# Patient Record
Sex: MALE | Race: WHITE
[De-identification: names, ages, dates, MRNs, and addresses within clinical notes are randomized per-mention and may not be internally consistent; named-entity substitution may affect disease eponyms.]

---

## 2020-09-21 ENCOUNTER — HOSPITAL ENCOUNTER (OUTPATIENT)
Dept: HOSPITAL 75 - ER | Age: 60
Setting detail: OBSERVATION
LOS: 1 days | Discharge: HOME | End: 2020-09-22
Attending: SURGERY | Admitting: SURGERY
Payer: COMMERCIAL

## 2020-09-21 VITALS — WEIGHT: 139.99 LBS | HEIGHT: 65.75 IN | BODY MASS INDEX: 22.77 KG/M2

## 2020-09-21 VITALS — DIASTOLIC BLOOD PRESSURE: 75 MMHG | SYSTOLIC BLOOD PRESSURE: 133 MMHG

## 2020-09-21 DIAGNOSIS — Z80.8: ICD-10-CM

## 2020-09-21 DIAGNOSIS — S22.42XA: Primary | ICD-10-CM

## 2020-09-21 DIAGNOSIS — Z83.3: ICD-10-CM

## 2020-09-21 DIAGNOSIS — F17.210: ICD-10-CM
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
## 2020-09-21 LAB
ALBUMIN SERPL-MCNC: 4.3 GM/DL (ref 3.2–4.5)
ALP SERPL-CCNC: 48 U/L (ref 40–136)
ALT SERPL-CCNC: 14 U/L (ref 0–55)
BASOPHILS # BLD AUTO: 0.1 10^3/UL (ref 0–0.1)
BASOPHILS NFR BLD AUTO: 0 % (ref 0–10)
BILIRUB SERPL-MCNC: 0.2 MG/DL (ref 0.1–1)
BUN/CREAT SERPL: 10
CALCIUM SERPL-MCNC: 9.1 MG/DL (ref 8.5–10.1)
CHLORIDE SERPL-SCNC: 101 MMOL/L (ref 98–107)
CO2 SERPL-SCNC: 25 MMOL/L (ref 21–32)
CREAT SERPL-MCNC: 0.91 MG/DL (ref 0.6–1.3)
EOSINOPHIL # BLD AUTO: 0.3 10^3/UL (ref 0–0.3)
EOSINOPHIL NFR BLD AUTO: 2 % (ref 0–10)
EOSINOPHIL NFR BLD MANUAL: 3 %
GFR SERPLBLD BASED ON 1.73 SQ M-ARVRAT: > 60 ML/MIN
GLUCOSE SERPL-MCNC: 101 MG/DL (ref 70–105)
HCT VFR BLD CALC: 40 % (ref 40–54)
HGB BLD-MCNC: 13.8 G/DL (ref 13.3–17.7)
LYMPHOCYTES # BLD AUTO: 2.1 X 10^3 (ref 1–4)
LYMPHOCYTES NFR BLD AUTO: 14 % (ref 12–44)
MANUAL DIFFERENTIAL PERFORMED BLD QL: YES
MCH RBC QN AUTO: 33 PG (ref 25–34)
MCHC RBC AUTO-ENTMCNC: 35 G/DL (ref 32–36)
MCV RBC AUTO: 95 FL (ref 80–99)
MONOCYTES # BLD AUTO: 0.8 X 10^3 (ref 0–1)
MONOCYTES NFR BLD AUTO: 6 % (ref 0–12)
MONOCYTES NFR BLD: 5 %
NEUTROPHILS # BLD AUTO: 11.2 X 10^3 (ref 1.8–7.8)
NEUTROPHILS NFR BLD AUTO: 78 % (ref 42–75)
NEUTS BAND NFR BLD MANUAL: 80 %
NEUTS BAND NFR BLD: 1 %
PLATELET # BLD: 271 10^3/UL (ref 130–400)
PMV BLD AUTO: 9.1 FL (ref 7.4–10.4)
POTASSIUM SERPL-SCNC: 4.2 MMOL/L (ref 3.6–5)
PROT SERPL-MCNC: 7.7 GM/DL (ref 6.4–8.2)
RBC MORPH BLD: NORMAL
SODIUM SERPL-SCNC: 138 MMOL/L (ref 135–145)
VARIANT LYMPHS NFR BLD MANUAL: 11 %
WBC # BLD AUTO: 14.5 10^3/UL (ref 4.3–11)

## 2020-09-21 PROCEDURE — 85025 COMPLETE CBC W/AUTO DIFF WBC: CPT

## 2020-09-21 PROCEDURE — 96361 HYDRATE IV INFUSION ADD-ON: CPT

## 2020-09-21 PROCEDURE — 94640 AIRWAY INHALATION TREATMENT: CPT

## 2020-09-21 PROCEDURE — 94760 N-INVAS EAR/PLS OXIMETRY 1: CPT

## 2020-09-21 PROCEDURE — 80053 COMPREHEN METABOLIC PANEL: CPT

## 2020-09-21 PROCEDURE — 36415 COLL VENOUS BLD VENIPUNCTURE: CPT

## 2020-09-21 PROCEDURE — 74177 CT ABD & PELVIS W/CONTRAST: CPT

## 2020-09-21 PROCEDURE — 96374 THER/PROPH/DIAG INJ IV PUSH: CPT

## 2020-09-21 PROCEDURE — 96375 TX/PRO/DX INJ NEW DRUG ADDON: CPT

## 2020-09-21 PROCEDURE — 71045 X-RAY EXAM CHEST 1 VIEW: CPT

## 2020-09-21 PROCEDURE — 94664 DEMO&/EVAL PT USE INHALER: CPT

## 2020-09-21 PROCEDURE — 71260 CT THORAX DX C+: CPT

## 2020-09-21 PROCEDURE — 85027 COMPLETE CBC AUTOMATED: CPT

## 2020-09-21 PROCEDURE — 85007 BL SMEAR W/DIFF WBC COUNT: CPT

## 2020-09-21 PROCEDURE — 99283 EMERGENCY DEPT VISIT LOW MDM: CPT

## 2020-09-21 PROCEDURE — 96376 TX/PRO/DX INJ SAME DRUG ADON: CPT

## 2020-09-21 RX ADMIN — OXYCODONE HYDROCHLORIDE AND ACETAMINOPHEN PRN TAB: 10; 325 TABLET ORAL at 23:37

## 2020-09-21 RX ADMIN — SODIUM CHLORIDE, SODIUM LACTATE, POTASSIUM CHLORIDE, AND CALCIUM CHLORIDE SCH MLS/HR: 600; 310; 30; 20 INJECTION, SOLUTION INTRAVENOUS at 23:15

## 2020-09-21 NOTE — ANESTHESIA-PROCEDURE NOTE
Procedures/Interventions


Procedure Start/Stop/Diagnosis


Date of Procedure:  Sep 21, 2020


Start Time:  21:11


Stop Time:  21:25





Additional Procedures


Procedures


attempted intercostal nerve block.  inject rib 8 on left after walking off rib 

inferiorly .  1 cc or .5 ropiviciane injected after negative aspiration.   pt 

said  i dont want anymore.  procedure aborted.











YURI CHUNG CRNA              Sep 21, 2020 21:41

## 2020-09-21 NOTE — NUR
ASSUMED CARE OF THIS PATIENT AT THIS TIME. REPORT FROM SRIKANTH EMANUEL. PATIENT IN CT 
AT THIS TIME.

## 2020-09-21 NOTE — NUR
JACKIE ABRAHAM admitted to room 419-1, with an admitting diagnosis of rib fracture and 
intractable pain, on 09/21/20 from [facility] via wheelchair, accompanied by 
staff.JACKIE ABRAHAM introduced to surroundings, call light, bed controls, phone, TV, 
temperature control, lights, meal times, smoking policy, visitor policy, side rail policy, 
bathrooms and showers.  Patient Rights given to patient in the handbook. JACKIE ABRAHAM 
verbalizes understanding that [facility] is not responsible for the loss or damage to any 
personal effects or valuables that are kept in the patients posession during their 
hospitalization.

## 2020-09-21 NOTE — NUR
Pulled 1% Lidocaine for the anesthesiologist Dr. Allen Castro. Patient did not tolerate 
intercostal block well and requested Dr. Castro to stop after the first block was placed. 
Remaining Lidocaine placed in patient drawer with ID sticker.

## 2020-09-21 NOTE — DIAGNOSTIC IMAGING REPORT
PROCEDURE: CT chest, abdomen, and pelvis with contrast.



TECHNIQUE: Multiple contiguous axial images were obtained through

the chest, abdomen, and pelvis after the administration of

intravenous contrast. Auto Exposure Controls were utilized during

the CT exam to meet ALARA standards for radiation dose reduction.





DATE: September 21, 2020.



COMPARISON: September 21, 2020. 



INDICATION: 59-year-old male, fall. Left rib pain. Hypotension.



FINDINGS: 



There are mild upper lobe findings of emphysema. There is a 3 mm

right lower lobe pulmonary nodule on axial image 44. There is no

identified lung mass. There is no otherwise noted focal airspace

consolidation. There is no pneumothorax. There is no pleural

effusion. The central airways are patent.



There is no identified central pulmonary embolus. The main

pulmonary artery is normal in caliber. The heart is not enlarged.

There is no pericardial effusion. There are coronary artery

calcifications and additional areas of atherosclerotic disease.

There is no evidence of acute aortic injury. There is no

mediastinal hematoma.



The liver is unremarkable in size and contour. There is no

identified liver laceration. There is no perihepatic fluid. The

main, right, and left portal veins are patent. The gallbladder is

unremarkable. There is no biliary ductal dilation. The main

pancreatic duct is not abnormally dilated. Unremarkable

appearance of the pancreatic parenchyma. The spleen is normal in

size. The adrenal glands are unremarkable.



Unremarkable appearance of the renal parenchyma. The urinary

collecting systems are not distended. The urinary bladder is

unremarkable.



The intestinal tract is not distended. There is no free

intraperitoneal air. There is no drainable fluid collection.

There is no free pelvic fluid.



There is no identified abnormally enlarged lymph node in the

abdomen or pelvis meeting CT size criteria for adenopathy.



There are 2 separate displaced fractures of the left 7th rib as

well as a displaced fracture of the left 8th rib. There is a

mildly displaced fracture of the right lateral 4th rib of

uncertain exact age. There are chronic-appearing deformities of

the right 3rd, 5th, and 6th ribs as well as a chronic-appearing

deformity of the right 12th rib and 10th and 11th ribs. 



IMPRESSION: 

CT chest, abdomen, and pelvis.

1. Acute displaced fractures of the left 7th rib laterally, left

7th rib posteriorly, and left 8th rib.

2. No sizable pleural effusion or pneumothorax.

3. No additional acute cardiopulmonary abnormality.

4. No identified acute abnormality in the abdomen or pelvis.



Dictated by: 



  Dictated on workstation # [id number]

## 2020-09-21 NOTE — ED GENERAL
General


Chief Complaint:  Chest Wall


Stated Complaint:  FALL;RIB PAIN


Source of Information:  Patient


Exam Limitations:  No Limitations





History of Present Illness


Date Seen by Provider:  Sep 21, 2020


Time Seen by Provider:  18:48


Initial Comments


To ER with reports of a fall and shortness of breath/left lateral rib pain after

a fall down a few stairs from about chest height just prior to arrival.


Timing/Duration:  1-2 Days


Severity:  Moderate


Associated Systoms:  Denies Symptoms





Allergies and Home Medications


Allergies


Coded Allergies:  


     No Known Drug Allergies (Unverified , 12)





Home Medications


Cyclobenzaprine Hcl 10 Mg Tablet, 1 EACH PO Q8HR PRN


   Prescribed by: MAKENZIE GERMAIN on 12 1123


Hydrocodone Bit/Acetaminophen 1 Each Tablet, 1-2 EACH PO Q6H PRN


   Prescribed by: MAKENZIE GERMAIN on 12 1123


Naproxen 500 Mg Tablet, 1 EACH PO BID PRN


   Prescribed by: MAKENZIE GERMAIN on 12 1123





Patient Home Medication List


Home Medication List Reviewed:  Yes





Review of Systems


Review of Systems


Constitutional:  see HPI


EENTM:  see HPI


Respiratory:  no symptoms reported


Cardiovascular:  no symptoms reported


Genitourinary:  no symptoms reported


Musculoskeletal:  no symptoms reported


Skin:  no symptoms reported


Psychiatric/Neurological:  No Symptoms Reported


Hematologic/Lymphatic:  No Symptoms Reported


Immunological/Allergic:  no symptoms reported





Past Medical-Social-Family Hx


Patient Social History


Alcohol Use:  Occasionally Uses


Recreational Drug Use:  No


Smoking Status:  Current Everyday Smoker


Type Used:  Cigarettes


Recent Foreign Travel:  No


Contact w/Someone Who Travel:  No


Recent Hopitalizations:  No





Seasonal Allergies


Seasonal Allergies:  No





Past Medical History


Surgeries:  No


Respiratory:  No


Cardiac:  No


Neurological:  No


Genitourinary:  No


Gastrointestinal:  No


Musculoskeletal:  No


Endocrine:  No


HEENT:  No


Cancer:  No


Psychosocial:  No


Integumentary:  No





Physical Exam


Vital Signs





Vital Signs - First Documented








 20





 18:23


 


Temp 36.0


 


Pulse 82


 


Resp 14


 


B/P (MAP) 88/53 (65)


 


Pulse Ox 96





Capillary Refill :


Height, Weight, BMI


Height: '"


Weight: lbs. oz. kg;  BMI


Method:Stated


General Appearance:  No Apparent Distress, Thin, Other (little abrasion to the 

left lateral chest without palpable crepitus. Initial blood pressure was 80 

systolic, this was repeated on the left arm and found to be 111/69.)


Eyes:  Bilateral Eye Normal Inspection, Bilateral Eye PERRL


HEENT:  PERRL/EOMI, TMs Normal


Respiratory:  Normal Breath Sounds, No Accessory Muscle Use, No Respiratory Di

stress


Cardiovascular:  Regular Rate, Rhythm, Normal Peripheral Pulses


Gastrointestinal:  Non Tender, Soft


Extremity:  Normal Capillary Refill, Normal Inspection


Neurologic/Psychiatric:  Alert, Oriented x3


Skin:  Normal Color, Warm/Dry





Progress/Results/Core Measures


Suspected Sepsis


SIRS


Temperature: 


Pulse:  


Respiratory Rate: 


 


Laboratory Tests


20 18:21: White Blood Count 14.5H


Blood Pressure  / 


Mean: 


 


Laboratory Tests


20 18:21: 


Creatinine 0.91, Platelet Count 271, Total Bilirubin 0.2








Results/Orders


Lab Results





Laboratory Tests








Test


 20


18:21 Range/Units


 


 


White Blood Count


 14.5 H


 4.3-11.0


10^3/uL


 


Red Blood Count


 4.16 L


 4.35-5.85


10^6/uL


 


Hemoglobin 13.8  13.3-17.7  G/DL


 


Hematocrit 40  40-54  %


 


Mean Corpuscular Volume 95  80-99  FL


 


Mean Corpuscular Hemoglobin 33  25-34  PG


 


Mean Corpuscular Hemoglobin


Concent 35 


 32-36  G/DL





 


Red Cell Distribution Width 14.5  10.0-14.5  %


 


Platelet Count


 271 


 130-400


10^3/uL


 


Mean Platelet Volume 9.1  7.4-10.4  FL


 


Neutrophils (%) (Auto) 78 H 42-75  %


 


Lymphocytes (%) (Auto) 14  12-44  %


 


Monocytes (%) (Auto) 6  0-12  %


 


Eosinophils (%) (Auto) 2  0-10  %


 


Basophils (%) (Auto) 0  0-10  %


 


Neutrophils # (Auto) 11.2 H 1.8-7.8  X 10^3


 


Lymphocytes # (Auto) 2.1  1.0-4.0  X 10^3


 


Monocytes # (Auto) 0.8  0.0-1.0  X 10^3


 


Eosinophils # (Auto)


 0.3 


 0.0-0.3


10^3/uL


 


Basophils # (Auto)


 0.1 


 0.0-0.1


10^3/uL


 


Neutrophils % (Manual) 80   %


 


Lymphocytes % (Manual) 11   %


 


Monocytes % (Manual) 5   %


 


Eosinophils % (Manual) 3   %


 


Band Neutrophils 1   %


 


Blood Morphology Comment NORMAL   


 


Sodium Level 138  135-145  MMOL/L


 


Potassium Level 4.2  3.6-5.0  MMOL/L


 


Chloride Level 101    MMOL/L


 


Carbon Dioxide Level 25  21-32  MMOL/L


 


Anion Gap 12  5-14  MMOL/L


 


Blood Urea Nitrogen 9  7-18  MG/DL


 


Creatinine


 0.91 


 0.60-1.30


MG/DL


 


Estimat Glomerular Filtration


Rate > 60 


  





 


BUN/Creatinine Ratio 10   


 


Glucose Level 101    MG/DL


 


Calcium Level 9.1  8.5-10.1  MG/DL


 


Corrected Calcium 8.9  8.5-10.1  MG/DL


 


Total Bilirubin 0.2  0.1-1.0  MG/DL


 


Aspartate Amino Transf


(AST/SGOT) 24 


 5-34  U/L





 


Alanine Aminotransferase


(ALT/SGPT) 14 


 0-55  U/L





 


Alkaline Phosphatase 48    U/L


 


Total Protein 7.7  6.4-8.2  GM/DL


 


Albumin 4.3  3.2-4.5  GM/DL








My Orders





Orders - ADAM URBINA


Hydromorphone Injection (Dilaudid Inject (20 18:37)


Cbc With Automated Diff (20 18:43)


Comprehensive Metabolic Panel (20 18:43)


Chest 1 View, Ap/Pa Only (20 18:43)


Ct Chest/Abdomen/Pelvis W (20 18:43)


Ns Iv 1000 Ml (Sodium Chloride 0.9%) (20 18:40)


Hydromorphone Injection (Dilaudid Inject (20 19:00)


Hydromorphone Injection (Dilaudid Inject (20 19:00)


Manual Differential (20 18:21)


Iohexol Injection (Omnipaque 350 Mg/Ml 1 (20 19:00)


Received Contrast (Hold Metformin- Contr (20 19:00)


Ns (Ivpb) (Sodium Chloride 0.9% Ivpb Bag (20 19:00)


Incentive Spirometry (Nursing) Q2H (20 19:58)


Ketamine Syringe (Ed Only) (Ketamine Syr (20 20:00)


Ns (Ivpb) (Sodium Chloride 0.9%) (20 20:15)


Ns (Ivpb) (Sodium Chloride 0.9%) (20 19:59)


Ketorolac Injection (Toradol Injection) (20 20:15)





Medications Given in ED





Current Medications








 Medications  Dose


 Ordered  Sig/Leonard


 Route  Start Time


 Stop Time Status Last Admin


Dose Admin


 


 Hydromorphone HCl  0.5 mg  ONCE  ONCE


 IV  20 19:00


 20 19:01 DC 20 18:38


0.5 MG


 


 Hydromorphone HCl  0.5 mg  ONCE  ONCE


 IV  20 19:00


 20 19:01 DC 20 19:30


0.5 MG


 


 Iohexol  100 ml  ONCE  ONCE


 IV  20 19:00


 20 19:01 DC 20 19:12


100 ML


 


 Ketamine HCl  15 mg  ONCE  ONCE


 IV  20 20:00


 20 20:01 DC 20 20:12


15 MG


 


 Sodium Chloride  100 ml  ONCE  ONCE


 IV  20 19:00


 20 19:01 DC 20 19:12


100 ML


 


 Sodium Chloride  250 ml @ 


 999 mls/hr  Q16M ONCE


 IV  20 20:15


 20 20:30  20 20:12


999 MLS/HR


 


 Sodium Chloride  1,000 ml @ 


 ud  STK-MED ONCE


 .ROUTE  20 18:40


 20 18:46 DC 20 18:40


999 MLS/HR








Vital Signs/I&O











 20





 18:23


 


Temp 36.0


 


Pulse 82


 


Resp 14


 


B/P (MAP) 88/53 (65)


 


Pulse Ox 96





Capillary Refill :





Diagnostic Imaging





   Diagonstic Imaging:  Xray


Comments


NAME:   JACKIE ABRAHAM


MED REC#:   [id number]


ACCOUNT#:   [id number]


PT STATUS:   REG ER


:   1960


PHYSICIAN:   ADAM URBINA


ADMIT DATE:   20/ER


                                  ***Signed***


Date of Exam:20





CT CHEST/ABDOMEN/PELVIS W








PROCEDURE: CT chest, abdomen, and pelvis with contrast.





TECHNIQUE: Multiple contiguous axial images were obtained through


the chest, abdomen, and pelvis after the administration of


intravenous contrast. Auto Exposure Controls were utilized during


the CT exam to meet ALARA standards for radiation dose reduction.








DATE: 2020.





COMPARISON: 2020. 





INDICATION: 59-year-old male, fall. Left rib pain. Hypotension.





FINDINGS: 





There are mild upper lobe findings of emphysema. There is a 3 mm


right lower lobe pulmonary nodule on axial image 44. There is no


identified lung mass. There is no otherwise noted focal airspace


consolidation. There is no pneumothorax. There is no pleural


effusion. The central airways are patent.





There is no identified central pulmonary embolus. The main


pulmonary artery is normal in caliber. The heart is not enlarged.


There is no pericardial effusion. There are coronary artery


calcifications and additional areas of atherosclerotic disease.


There is no evidence of acute aortic injury. There is no


mediastinal hematoma.





The liver is unremarkable in size and contour. There is no


identified liver laceration. There is no perihepatic fluid. The


main, right, and left portal veins are patent. The gallbladder is


unremarkable. There is no biliary ductal dilation. The main


pancreatic duct is not abnormally dilated. Unremarkable


appearance of the pancreatic parenchyma. The spleen is normal in


size. The adrenal glands are unremarkable.





Unremarkable appearance of the renal parenchyma. The urinary


collecting systems are not distended. The urinary bladder is


unremarkable.





The intestinal tract is not distended. There is no free


intraperitoneal air. There is no drainable fluid collection.


There is no free pelvic fluid.





There is no identified abnormally enlarged lymph node in the


abdomen or pelvis meeting CT size criteria for adenopathy.





There are 2 separate displaced fractures of the left 7th rib as


well as a displaced fracture of the left 8th rib. There is a


mildly displaced fracture of the right lateral 4th rib of


uncertain exact age. There are chronic-appearing deformities of


the right 3rd, 5th, and 6th ribs as well as a chronic-appearing


deformity of the right 12th rib and 10th and 11th ribs. 





IMPRESSION: 


CT chest, abdomen, and pelvis.


1. Acute displaced fractures of the left 7th rib laterally, left


7th rib posteriorly, and left 8th rib.


2. No sizable pleural effusion or pneumothorax.


3. No additional acute cardiopulmonary abnormality.


4. No identified acute abnormality in the abdomen or pelvis.





Dictated by: 





  Dictated on workstation # [id number]








Dict:   20


Trans:   20


CV [id number]





Interpreted by:     RIKKI SILVA MD


Electronically signed by: RIKKI SILVA MD 20





Departure


Communication (Admissions)


Time/Spoke to Admitting Phy:  20:16


-after 1mg dilaudid his pain is still at what he would consider an 

intolerable level. Ketamine ordered. We'll discuss with trauma surgeon.


-spoke with Dr. Ashraf, we'll admit, consult anesthesia for intercostal 

nerve block





Impression





   Primary Impression:  


   Rib fractures


   Additional Impression:  


   Intractable pain


Disposition:   ADMITTED AS INPATIENT


Condition:  Stable





Admissions


Decision to Admit Reason:  Admit from ER (Trauma)


Decision to Admit/Date:  Sep 21, 2020


Time/Decision to Admit Time:  19:59





Departure-Patient Inst.


Decision time for Depature:  19:52


Referrals:  


NO,LOCAL PHYSICIAN (PCP/Family)


Primary Care Physician











ADAM URBINA             Sep 21, 2020 18:50

## 2020-09-21 NOTE — DIAGNOSTIC IMAGING REPORT
INDICATION: Fall with left rib pain,



Single AP view of the chest is obtained. There is no previous

study available at this time for comparison. Heart size and

pulmonary vascularity are within normal limits. There is no

pneumothorax or consolidation. There is a mildly displaced

fracture involving the posterior aspect of left 6th rib with

nondisplaced fracture involving left 7th rib.



IMPRESSION: Left 6th and 7th rib fractures without evidence of

pneumothorax or significant pulmonary contusion.



Dictated by: 



  Dictated on workstation # [id number]

## 2020-09-22 VITALS — SYSTOLIC BLOOD PRESSURE: 117 MMHG | DIASTOLIC BLOOD PRESSURE: 63 MMHG

## 2020-09-22 VITALS — DIASTOLIC BLOOD PRESSURE: 66 MMHG | SYSTOLIC BLOOD PRESSURE: 98 MMHG

## 2020-09-22 VITALS — DIASTOLIC BLOOD PRESSURE: 76 MMHG | SYSTOLIC BLOOD PRESSURE: 108 MMHG

## 2020-09-22 VITALS — SYSTOLIC BLOOD PRESSURE: 111 MMHG | DIASTOLIC BLOOD PRESSURE: 69 MMHG

## 2020-09-22 VITALS — SYSTOLIC BLOOD PRESSURE: 104 MMHG | DIASTOLIC BLOOD PRESSURE: 71 MMHG

## 2020-09-22 LAB
ALBUMIN SERPL-MCNC: 3.5 GM/DL (ref 3.2–4.5)
ALP SERPL-CCNC: 43 U/L (ref 40–136)
ALT SERPL-CCNC: 11 U/L (ref 0–55)
BASOPHILS # BLD AUTO: 0 10^3/UL (ref 0–0.1)
BASOPHILS NFR BLD AUTO: 0 % (ref 0–10)
BILIRUB SERPL-MCNC: 0.3 MG/DL (ref 0.1–1)
BUN/CREAT SERPL: 11
CALCIUM SERPL-MCNC: 8.4 MG/DL (ref 8.5–10.1)
CHLORIDE SERPL-SCNC: 106 MMOL/L (ref 98–107)
CO2 SERPL-SCNC: 24 MMOL/L (ref 21–32)
CREAT SERPL-MCNC: 0.81 MG/DL (ref 0.6–1.3)
EOSINOPHIL # BLD AUTO: 0.3 10^3/UL (ref 0–0.3)
EOSINOPHIL NFR BLD AUTO: 4 % (ref 0–10)
GFR SERPLBLD BASED ON 1.73 SQ M-ARVRAT: > 60 ML/MIN
GLUCOSE SERPL-MCNC: 84 MG/DL (ref 70–105)
HCT VFR BLD CALC: 35 % (ref 40–54)
HGB BLD-MCNC: 11.7 G/DL (ref 13.3–17.7)
LYMPHOCYTES # BLD AUTO: 2.2 X 10^3 (ref 1–4)
LYMPHOCYTES NFR BLD AUTO: 27 % (ref 12–44)
MANUAL DIFFERENTIAL PERFORMED BLD QL: NO
MCH RBC QN AUTO: 32 PG (ref 25–34)
MCHC RBC AUTO-ENTMCNC: 34 G/DL (ref 32–36)
MCV RBC AUTO: 96 FL (ref 80–99)
MONOCYTES # BLD AUTO: 0.9 X 10^3 (ref 0–1)
MONOCYTES NFR BLD AUTO: 11 % (ref 0–12)
NEUTROPHILS # BLD AUTO: 4.8 X 10^3 (ref 1.8–7.8)
NEUTROPHILS NFR BLD AUTO: 58 % (ref 42–75)
PLATELET # BLD: 236 10^3/UL (ref 130–400)
PMV BLD AUTO: 9.2 FL (ref 7.4–10.4)
POTASSIUM SERPL-SCNC: 3.8 MMOL/L (ref 3.6–5)
PROT SERPL-MCNC: 5.9 GM/DL (ref 6.4–8.2)
SODIUM SERPL-SCNC: 138 MMOL/L (ref 135–145)
WBC # BLD AUTO: 8.3 10^3/UL (ref 4.3–11)

## 2020-09-22 RX ADMIN — SODIUM CHLORIDE, SODIUM LACTATE, POTASSIUM CHLORIDE, AND CALCIUM CHLORIDE SCH MLS/HR: 600; 310; 30; 20 INJECTION, SOLUTION INTRAVENOUS at 05:28

## 2020-09-22 RX ADMIN — OXYCODONE HYDROCHLORIDE AND ACETAMINOPHEN PRN TAB: 10; 325 TABLET ORAL at 11:07

## 2020-09-22 RX ADMIN — OXYCODONE HYDROCHLORIDE AND ACETAMINOPHEN PRN TAB: 10; 325 TABLET ORAL at 05:27

## 2020-09-22 NOTE — DISCHARGE INST-SURGICAL
Discharge Inst-Surgical


Depart Medication/Instructions


New, Converted or Re-Newed RX:  RX Given to Pt/Family


Patient Instructions


Follow up Appt:


Make appointment for 1 week. [phone]





Instructions:


No lifting greater than 20 pounds.


No strenuous activity. 


May shower in 24 hours, no tub bath or soaking.


Use incentive spirometer at home as directed.


No Smoking








Symptoms to Report:


Appetite Changes, Extremity Discoloration, Numbness/Tingling, Swelling 

Increased, Bleeding Excessive, Eyesight Changes, Pain Increased, Urine Color 

Change, Constipation(Persistent), Fever over 101 degree F, Pain/Pressure in 

chest, Urinating Difficulty, Cough Up/Vomit Blood, Heart Beat Irreg/Pounding, 

Pain/Pressure in jaw, Cramps in feet or legs, Lightheadedness, Pain/Pressure in 

shoulder, Diarrhea(Persistent), Memory Changes Suddenly, Questions/Concerns, 

Weight gain consecutive days, Dizziness/Fainting, Nausea/Vomiting, Shortness of 

Breath, Weight gain over 2 pounds








If questions or concerns contact your physician 


Or seek help at emergency department.





Activity


Activity as Tolerated:  Yes


Activity Instructions:  Avoid Pulling & Pushing


Driving Instructions:  No Driving/Refer to Dr. Sylvester


Discharge Diet:  No Restrictions


Diet After 24 Hours:  Clear Liquid if Nauseous


If Any Problems/Questions/Issu:  Contact Your Physician, Go to Emergency Room





Skin/Wound Care


Infection Signs and Symptoms:  Increased Redness, Increased Swelling, 

Temperature Above 101  F


Bathing Instructions:  GARRETT Mejia DO               Sep 22, 2020 15:55

## 2020-09-22 NOTE — HISTORY & PHYSICAL-SURGICAL
BARON LIMA MED STUDENT 20 1046:


History of Present Illness


History of Present Illness


Reason for visit/HPI


cc- L rib 7 and rib 8 fractures 





hpi: pt was sitting up in bed watching tv. pt stated that was walking and fell 

onto the steps and hit his left side. pt describes the pain as sharp. pt rated 

pain as an 8 out of 10. pt stated that coughing really makes the pain worse. pt 

stated that only the pain medications make the pain better. pt states that he is

doing the incentive spirometer. there are no lacerations or any bruising along 

the L ribs.


Date of Admission


Sep 21, 2020 at 20:13


Date Seen by a Provider:  Sep 22, 2020


Time Seen by a Provider:  09:50


I consulted on this patient on


20


 10:41


Attending Physician


Garrett Ashraf DO


Admitting Physician


No,Local Physician


Consult








Allergies and Home Medications


Allergies


Coded Allergies:  


     No Known Drug Allergies (Unverified , 12)





Home Medications


Ketorolac Tromethamine 10 Mg Tablet, 10 MG PO Q8H


   Prescribed by: GARRETT ASHRAF on 20 1253


Oxycodone HCl/Acetaminophen 1 Each Tablet, 1 TAB PO Q6H PRN for PAIN-MODERATE 

(5-7)


   Prescribed by: GARRETT ASHRAF on 20 1253





Past Medical-Social-Family Hx


Patient Social History


Alcohol Use:  Regular Use


Recreational Drug Use:  No


Smoking Status:  Current Everyday Smoker


Type Used:  Cigarettes





Seasonal Allergies


Seasonal Allergies:  No





Surgeries


History of Surgeries:  No





Respiratory


History of Respiratory Disorde:  No





Cardiovascular


History of Cardiac Disorders:  No





Neurological


History of Neurological Disord:  No





Genitourinary


History of Genitourinary Disor:  No





Gastrointestinal


History of Gastrointestinal Di:  No





Musculoskeletal


History of Musculoskeletal Dis:  No





Endocrine


History of Endocrine Disorders:  No





HEENT


History of HEENT Disorders:  No





Cancer


History of Cancer:  No





Psychosocial


History of Psychiatric Problem:  No





Integumentary


History of Skin or Integumenta:  No





Family Medical History


Significant Family History:  Cancer (mother  of bone marrow cancer ), 

Diabetes


Family Medial History:  


Completed stroke


  19 FATHER


Diabetes mellitus


  20 MOTHER





Review of Systems


Constitutional:  No chills, No fever, No malaise


Respiratory:  No cough, No short of breath


Cardiovascular:  No chest pain, No palpitations


Gastrointestinal:  No abdominal pain, No constipation, No diarrhea, No nausea, 

No vomiting


Genitourinary:  No dysuria, No incontinence





Physical Exam


Vital Signs





Vital Signs - First Documented








 20





 18:23 00:30


 


Temp 36.0 


 


Pulse 82 


 


Resp 14 


 


B/P (MAP) 88/53 (65) 


 


Pulse Ox 96 


 


FiO2  21





Capillary Refill : Less Than 3 Seconds


Height, Weight, BMI


Height: '"


Weight: lbs. oz. kg; 22.76 BMI


Method:Stated


General Appearance:  No Apparent Distress, WD/WN


HEENT:  No Scleral Icterus (L), No Scleral Icterus (R)


Neck:  Non Tender; No Lymphadenopathy (L), No Lymphadenopathy (R)


Respiratory:  No Chest Non Tender; Lungs Clear, No Accessory Muscle Use, No 

Respiratory Distress, Other (chest pain with deep inspiration )


Cardiovascular:  Regular Rate, Rhythm, No Murmur, Normal Peripheral Pulses


Neurologic/Psychiatric:  Alert, Oriented x3, No Motor/Sensory Deficits, Normal 

Mood/Affect, CNs II-XII Norm as Tested


Skin:  Normal Color, Warm/Dry


Lymphatic:  No Adenopathy





Data Review


Labs


Laboratory Tests


20 18:21: 


White Blood Count 14.5H, Red Blood Count 4.16L, Hemoglobin 13.8, Hematocrit 40, 

Mean Corpuscular Volume 95, Mean Corpuscular Hemoglobin 33, Mean Corpuscular 

Hemoglobin Concent 35, Red Cell Distribution Width 14.5, Platelet Count 271, 

Mean Platelet Volume 9.1, Neutrophils (%) (Auto) 78H, Lymphocytes (%) (Auto) 14,

Monocytes (%) (Auto) 6, Eosinophils (%) (Auto) 2, Basophils (%) (Auto) 0, 

Neutrophils # (Auto) 11.2H, Lymphocytes # (Auto) 2.1, Monocytes # (Auto) 0.8, 

Eosinophils # (Auto) 0.3, Basophils # (Auto) 0.1, Neutrophils % (Manual) 80, 

Lymphocytes % (Manual) 11, Monocytes % (Manual) 5, Eosinophils % (Manual) 3, 

Band Neutrophils 1, Blood Morphology Comment NORMAL, Sodium Level 138, Potassium

Level 4.2, Chloride Level 101, Carbon Dioxide Level 25, Anion Gap 12, Blood Urea

Nitrogen 9, Creatinine 0.91, Estimat Glomerular Filtration Rate > 60, 

BUN/Creatinine Ratio 10, Glucose Level 101, Calcium Level 9.1, Corrected Calcium

8.9, Total Bilirubin 0.2, Aspartate Amino Transf (AST/SGOT) 24, Alanine 

Aminotransferase (ALT/SGPT) 14, Alkaline Phosphatase 48, Total Protein 7.7, 

Albumin 4.3


20 04:45: 


White Blood Count 8.3, Red Blood Count 3.62L, Hemoglobin 11.7L, Hematocrit 35L, 

Mean Corpuscular Volume 96, Mean Corpuscular Hemoglobin 32, Mean Corpuscular 

Hemoglobin Concent 34, Red Cell Distribution Width 14.7H, Platelet Count 236, 

Mean Platelet Volume 9.2, Neutrophils (%) (Auto) 58, Lymphocytes (%) (Auto) 27, 

Monocytes (%) (Auto) 11, Eosinophils (%) (Auto) 4, Basophils (%) (Auto) 0, 

Neutrophils # (Auto) 4.8, Lymphocytes # (Auto) 2.2, Monocytes # (Auto) 0.9, 

Eosinophils # (Auto) 0.3, Basophils # (Auto) 0.0, Sodium Level 138, Potassium 

Level 3.8, Chloride Level 106, Carbon Dioxide Level 24, Anion Gap 8, Blood Urea 

Nitrogen 9, Creatinine 0.81, Estimat Glomerular Filtration Rate > 60, 

BUN/Creatinine Ratio 11, Glucose Level 84, Calcium Level 8.4L, Corrected Calcium

8.8, Total Bilirubin 0.3, Aspartate Amino Transf (AST/SGOT) 16, Alanine 

Aminotransferase (ALT/SGPT) 11, Alkaline Phosphatase 43, Total Protein 5.9L, 

Albumin 3.5








Assessment/Plan


L rib 7 and rib 8 fractures 





continue incentive spirometry 


pain control as needed





Clinical Quality Measures


DVT/VTE Risk/Contraindication:


Risk Factor Score Per Nursin


RFS Level Per Nursing on Admit:  2=Moderate





GARRETT ASHRAF DO 20 1602:


History of Present Illness


History of Present Illness


Reason for visit/HPI


Pt seen and examined, states pain is better.  Unable to tolerate rib block last 

night; states the pain he has now is better than it was and much better with 

pain pills.


Time Seen by a Provider:  12:36





Allergies and Home Medications


Allergies


Coded Allergies:  


     No Known Drug Allergies (Unverified , 12)





Home Medications


Ketorolac Tromethamine 10 Mg Tablet, 10 MG PO Q8H


   Prescribed by: GARRETT ASHRAF on 20 1253


Oxycodone HCl/Acetaminophen 1 Each Tablet, 1 TAB PO Q6H PRN for PAIN-MODERATE 

(5-7)


   Prescribed by: GARRETT ASHRAF on 20 1253





Patient Home Medication List


Home Medication List Reviewed:  Yes





Past Medical-Social-Family Hx


Patient Social History


Alcohol Use:  Regular Use


Smoking Status:  Current Everyday Smoker





Family Medical History


Significant Family History:  Cancer (mother  of bone marrow cancer ), Susana

betes


Family Medial History:  


Completed stroke


  19 FATHER


Diabetes mellitus


  19 MOTHER





Physical Exam


General Appearance:  Mild Distress, Thin


Eyes:  Bilateral Eye PERRL, Bilateral Eye EOMI


HEENT:  No Scleral Icterus (L), No Scleral Icterus (R); Other (poor dentition)


Respiratory:  Lungs Clear, No Accessory Muscle Use, No Respiratory Distress, 

Other (chest pain with deep inspiration )


Cardiovascular:  Regular Rate, Rhythm, No Murmur


Gastrointestinal:  No Organomegaly, Non Tender, Soft


Neurologic/Psychiatric:  Alert, Oriented x3, Normal Mood/Affect, CNs II-XII Norm

as Tested


Skin:  Normal Color, Warm/Dry


Lymphatic:  No Adenopathy (neck, axilla or groin)





Data Review


Radiology


Date of Exam:20





CT CHEST/ABDOMEN/PELVIS W








PROCEDURE: CT chest, abdomen, and pelvis with contrast.





TECHNIQUE: Multiple contiguous axial images were obtained through


the chest, abdomen, and pelvis after the administration of


intravenous contrast. Auto Exposure Controls were utilized during


the CT exam to meet ALARA standards for radiation dose reduction.








DATE: 2020.





COMPARISON: 2020. 





INDICATION: 59-year-old male, fall. Left rib pain. Hypotension.





FINDINGS: 





There are mild upper lobe findings of emphysema. There is a 3 mm


right lower lobe pulmonary nodule on axial image 44. There is no


identified lung mass. There is no otherwise noted focal airspace


consolidation. There is no pneumothorax. There is no pleural


effusion. The central airways are patent.





There is no identified central pulmonary embolus. The main


pulmonary artery is normal in caliber. The heart is not enlarged.


There is no pericardial effusion. There are coronary artery


calcifications and additional areas of atherosclerotic disease.


There is no evidence of acute aortic injury. There is no


mediastinal hematoma.





The liver is unremarkable in size and contour. There is no


identified liver laceration. There is no perihepatic fluid. The


main, right, and left portal veins are patent. The gallbladder is


unremarkable. There is no biliary ductal dilation. The main


pancreatic duct is not abnormally dilated. Unremarkable


appearance of the pancreatic parenchyma. The spleen is normal in


size. The adrenal glands are unremarkable.





Unremarkable appearance of the renal parenchyma. The urinary


collecting systems are not distended. The urinary bladder is


unremarkable.





The intestinal tract is not distended. There is no free


intraperitoneal air. There is no drainable fluid collection.


There is no free pelvic fluid.





There is no identified abnormally enlarged lymph node in the


abdomen or pelvis meeting CT size criteria for adenopathy.





There are 2 separate displaced fractures of the left 7th rib as


well as a displaced fracture of the left 8th rib. There is a


mildly displaced fracture of the right lateral 4th rib of


uncertain exact age. There are chronic-appearing deformities of


the right 3rd, 5th, and 6th ribs as well as a chronic-appearing


deformity of the right 12th rib and 10th and 11th ribs. 





IMPRESSION: 


CT chest, abdomen, and pelvis.


1. Acute displaced fractures of the left 7th rib laterally, left


7th rib posteriorly, and left 8th rib.


2. No sizable pleural effusion or pneumothorax.


3. No additional acute cardiopulmonary abnormality.


4. No identified acute abnormality in the abdomen or pelvis.





Dictated by: 





  Dictated on workstation # [id number]








Dict:   20


Trans:   20


CV [id number]





Interpreted by:     RIKKI SILVA MD


Electronically signed by: RIKKI SILVA MD 20





Assessment/Plan


Assessment/Plan


Admission Diagonsis


Multiple rib fx


Uncontrollable pain


Admission Status:  Observation


Assessment/Plan


Multiple rib fx


Uncontrollable pain





Pain control, must use IS and take deep breaths.  Pt will be sent home with some

Percocet and Toradol for pain relief.  Pt told he could develop pneumonia if he 

doesn't take breaths; therefore, he must continue to do that 


even though it is very painful. No reason to keep him longer in the hospital and

he wants to go home; will D/C him home.





Supervisory-Addendum Brief


Verification & Attestation


Participated in pt care:  history, MDM, physical


Personally performed:  exam, history, MDM


Care discussed with:  Medical Student


Procedures:  n/a


Verification and Attestation of Medical Student E/M Service





A medical student performed and documented this service. I then reviewed and 

verified all information documented by the medical student and made 

modifications to such information, when appropriate. I personally performed a 

physical exam, medical decision making and then discussed any differences 

between the notes and made revisions as necessary to create one note.





Garrett Ashraf , 20 , 16:02











BARON LIMA MED STUDENT        Sep 22, 2020 10:46


GARRETT ASHRAF DO               Sep 22, 2020 16:02

## 2020-09-22 NOTE — NUR
SPOKE WITH THE PT TO COMPLETE THE MED REC



PT DENIES TAKING ANY PRESCRIPTION OR OTC MEDS



I DID UPDATE THE PTS PREFERRED PHARMACY TO [facility]